# Patient Record
Sex: MALE | Race: WHITE | URBAN - METROPOLITAN AREA
[De-identification: names, ages, dates, MRNs, and addresses within clinical notes are randomized per-mention and may not be internally consistent; named-entity substitution may affect disease eponyms.]

---

## 2022-03-15 ENCOUNTER — OFFICE VISIT (OUTPATIENT)
Dept: PRIMARY CARE CLINIC | Age: 52
End: 2022-03-15
Payer: COMMERCIAL

## 2022-03-15 VITALS
WEIGHT: 315 LBS | DIASTOLIC BLOOD PRESSURE: 68 MMHG | SYSTOLIC BLOOD PRESSURE: 122 MMHG | TEMPERATURE: 97.9 F | HEART RATE: 93 BPM | OXYGEN SATURATION: 96 %

## 2022-03-15 DIAGNOSIS — R09.81 NASAL CONGESTION: ICD-10-CM

## 2022-03-15 DIAGNOSIS — R52 BODY ACHES: ICD-10-CM

## 2022-03-15 DIAGNOSIS — R50.9 FEVER, UNSPECIFIED FEVER CAUSE: Primary | ICD-10-CM

## 2022-03-15 DIAGNOSIS — R11.0 NAUSEA: ICD-10-CM

## 2022-03-15 LAB
INFLUENZA A ANTIGEN, POC: NORMAL
INFLUENZA B ANTIGEN, POC: NORMAL
Lab: NORMAL
QC PASS/FAIL: NORMAL
SARS-COV-2 RDRP RESP QL NAA+PROBE: NEGATIVE

## 2022-03-15 PROCEDURE — 87804 INFLUENZA ASSAY W/OPTIC: CPT | Performed by: NURSE PRACTITIONER

## 2022-03-15 PROCEDURE — 3017F COLORECTAL CA SCREEN DOC REV: CPT | Performed by: NURSE PRACTITIONER

## 2022-03-15 PROCEDURE — G8421 BMI NOT CALCULATED: HCPCS | Performed by: NURSE PRACTITIONER

## 2022-03-15 PROCEDURE — 1036F TOBACCO NON-USER: CPT | Performed by: NURSE PRACTITIONER

## 2022-03-15 PROCEDURE — G8484 FLU IMMUNIZE NO ADMIN: HCPCS | Performed by: NURSE PRACTITIONER

## 2022-03-15 PROCEDURE — 99213 OFFICE O/P EST LOW 20 MIN: CPT | Performed by: NURSE PRACTITIONER

## 2022-03-15 PROCEDURE — 87635 SARS-COV-2 COVID-19 AMP PRB: CPT | Performed by: NURSE PRACTITIONER

## 2022-03-15 PROCEDURE — G8427 DOCREV CUR MEDS BY ELIG CLIN: HCPCS | Performed by: NURSE PRACTITIONER

## 2022-03-15 RX ORDER — OMEPRAZOLE 20 MG/1
CAPSULE, DELAYED RELEASE ORAL
COMMUNITY
Start: 2022-01-22

## 2022-03-15 RX ORDER — LANCETS 33 GAUGE
EACH MISCELLANEOUS
COMMUNITY
Start: 2022-03-14

## 2022-03-15 RX ORDER — SITAGLIPTIN AND METFORMIN HYDROCHLORIDE 1000; 50 MG/1; MG/1
TABLET, FILM COATED ORAL
COMMUNITY
Start: 2022-02-08

## 2022-03-15 RX ORDER — LOSARTAN POTASSIUM 100 MG/1
TABLET ORAL
COMMUNITY
Start: 2022-01-13

## 2022-03-15 RX ORDER — REPAGLINIDE 2 MG/1
TABLET ORAL
COMMUNITY
Start: 2022-02-09

## 2022-03-15 RX ORDER — METOPROLOL SUCCINATE 50 MG/1
TABLET, EXTENDED RELEASE ORAL
COMMUNITY
Start: 2022-03-07

## 2022-03-15 RX ORDER — SOTALOL HYDROCHLORIDE 160 MG/1
TABLET ORAL
COMMUNITY
Start: 2022-01-13

## 2022-03-15 RX ORDER — FLUTICASONE PROPIONATE 50 MCG
2 SPRAY, SUSPENSION (ML) NASAL DAILY
Qty: 16 G | Refills: 0 | Status: SHIPPED | OUTPATIENT
Start: 2022-03-15

## 2022-03-15 RX ORDER — DAPAGLIFLOZIN 10 MG/1
TABLET, FILM COATED ORAL
COMMUNITY
Start: 2022-02-08

## 2022-03-15 RX ORDER — ONDANSETRON 4 MG/1
4 TABLET, ORALLY DISINTEGRATING ORAL 3 TIMES DAILY PRN
Qty: 21 TABLET | Refills: 0 | Status: SHIPPED | OUTPATIENT
Start: 2022-03-15

## 2022-03-15 RX ORDER — DILTIAZEM HYDROCHLORIDE 180 MG/1
CAPSULE, EXTENDED RELEASE ORAL
COMMUNITY
Start: 2021-12-29

## 2022-03-15 RX ORDER — BLOOD SUGAR DIAGNOSTIC
STRIP MISCELLANEOUS
COMMUNITY
Start: 2021-12-29

## 2022-03-15 ASSESSMENT — PATIENT HEALTH QUESTIONNAIRE - PHQ9
SUM OF ALL RESPONSES TO PHQ QUESTIONS 1-9: 0
SUM OF ALL RESPONSES TO PHQ QUESTIONS 1-9: 0
2. FEELING DOWN, DEPRESSED OR HOPELESS: 0
SUM OF ALL RESPONSES TO PHQ9 QUESTIONS 1 & 2: 0
SUM OF ALL RESPONSES TO PHQ QUESTIONS 1-9: 0
1. LITTLE INTEREST OR PLEASURE IN DOING THINGS: 0
SUM OF ALL RESPONSES TO PHQ QUESTIONS 1-9: 0

## 2022-03-15 NOTE — PROGRESS NOTES
Chief Complaint   Concern For COVID-19 (since yesterday, achey, tired, nausea, ha, sinus pressure, fever - 102 yesterday,   diarrhea this am  )      History of Present Illness   Source of history provided by:  patient. Elan Root is a 46 y.o. old male who presents to the walk in clinic with complaints of Fever, Chills, Generalized Body Aches, Headache, Nasal Congestion, Post nasal drip, Nausea, Diarrhea  and Fatigue x 1 days. States symptoms have improved since onset. Has been taking no OTC medications. Denies any Cough, Shortness of breath, Vomiting, Chest Pain, LE Edema, Abdominal Pain, Rash or Close contact with a lab confirmed COVID-19 patient within 14 days of symptom onset . Denies any hx of asthma, COPD, emphysema or pneumonia. ROS   Pertinent positives and negatives are stated within HPI, all other systems reviewed and are negative. Past Medical History:  has no past medical history on file. Past Surgical History:  has no past surgical history on file. Social History:  reports that he has never smoked. He has never used smokeless tobacco.  Family History: family history is not on file. Allergies: Lactose    Physical Exam   Vital Signs:  /68 (Site: Left Upper Arm, Position: Sitting, Cuff Size: Large Adult)   Pulse 93   Temp 97.9 °F (36.6 °C) (Temporal)   Wt (!) 346 lb 3.2 oz (157 kg)   SpO2 96%    Oxygen Saturation Interpretation: Normal.    Constitutional:  Alert, development consistent with age. NAD. Moderate amount of swelling noted to bilateral nares. Head:  NC/NT. Airway patent. Ears: TMs clear bilaterally. Canals without exudate or swelling bilaterally. Mouth: Posterior pharynx with mild erythema and clear postnasal drip. No tonsillar hypertrophy or exudate. Neck:  Normal ROM. Supple. No anterior cervical adenopathy noted. Lungs: CTAB without wheezes, rales, or rhonchi.    CV:  Regular rate and rhythm, normal heart sounds, without pathological murmurs, ectopy, gallops, or rubs. Skin:  Normal turgor. Warm, dry, without visible rash. Lymphatic: No lymphangitis or adenopathy noted. Neurological:  Oriented. Motor functions intact. Lab / Imaging Results   (All laboratory and radiology results have been personally reviewed by myself)  Labs:  Results for orders placed or performed in visit on 03/15/22   POCT COVID-19 Rapid, NAAT   Result Value Ref Range    SARS-COV-2, RdRp gene Negative Negative    Lot Number [id number]     QC Pass/Fail pass    POCT Influenza A/B Antigen (BD Veritor)   Result Value Ref Range    Inflenza A Ag neg     Influenza B Ag neg        Imaging: All Radiology results interpreted by Radiologist unless otherwise noted. No results found. Medical Decision Making   Pt non-toxic, in no apparent distress and stable at time of discharge. Assessment/Plan   aNtan Espinoza was seen today for concern for covid-19. Diagnoses and all orders for this visit:    Fever, unspecified fever cause  -     POCT COVID-19 Rapid, NAAT  -     POCT Influenza A/B Antigen (BD Veritor)    Body aches  -     POCT COVID-19 Rapid, NAAT  -     POCT Influenza A/B Antigen (BD Veritor)    Nausea  -     POCT COVID-19 Rapid, NAAT  -     POCT Influenza A/B Antigen (BD Veritor)  -     ondansetron (ZOFRAN-ODT) 4 MG disintegrating tablet; Take 1 tablet by mouth 3 times daily as needed for Nausea or Vomiting    Nasal congestion  -     fluticasone (FLONASE) 50 MCG/ACT nasal spray; 2 sprays by Each Nostril route daily        Rapid COVID-19 and influenza swabs obtained and noted to be negative. Increase fluids and rest. Symptomatic relief discussed including Tylenol prn pain/fever. Schedule f/u with PCP in 7-10 days if symptoms persist. ED sooner if symptoms worsen or change. ED immediately with high or refractory fever, progressive SOB, dyspnea, CP, calf pain/swelling, shaking chills, vomiting, abdominal pain, lethargy, flank pain, or decreased urinary output.  Pt verbalizes understanding and is in agreement with plan of care. All questions answered. Reinaldo Renner APRN - NP    This visit was provided as a focused evaluation during the [place] -19 pandemic/national emergency. A comprehensive review of all previous patient history and testing was not conducted. Pertinent findings were elicited during the visit. *NOTE: This report was transcribed using voice recognition software. Every effort was made to ensure accuracy; however, inadvertent computerized transcription errors may be present.

## 2023-08-28 SDOH — HEALTH STABILITY: PHYSICAL HEALTH: ON AVERAGE, HOW MANY MINUTES DO YOU ENGAGE IN EXERCISE AT THIS LEVEL?: 20 MIN

## 2023-08-28 SDOH — HEALTH STABILITY: PHYSICAL HEALTH: ON AVERAGE, HOW MANY DAYS PER WEEK DO YOU ENGAGE IN MODERATE TO STRENUOUS EXERCISE (LIKE A BRISK WALK)?: 2 DAYS

## 2023-08-31 ENCOUNTER — OFFICE VISIT (OUTPATIENT)
Dept: PRIMARY CARE CLINIC | Age: 53
End: 2023-08-31
Payer: COMMERCIAL

## 2023-08-31 VITALS
SYSTOLIC BLOOD PRESSURE: 118 MMHG | HEIGHT: 70 IN | HEART RATE: 78 BPM | OXYGEN SATURATION: 97 % | WEIGHT: 315 LBS | TEMPERATURE: 97.6 F | DIASTOLIC BLOOD PRESSURE: 70 MMHG | BODY MASS INDEX: 45.1 KG/M2

## 2023-08-31 DIAGNOSIS — Z76.89 ENCOUNTER TO ESTABLISH CARE WITH NEW DOCTOR: Primary | ICD-10-CM

## 2023-08-31 DIAGNOSIS — G47.33 OSA ON CPAP: ICD-10-CM

## 2023-08-31 DIAGNOSIS — R29.898 LEFT ARM WEAKNESS: ICD-10-CM

## 2023-08-31 DIAGNOSIS — E11.9 TYPE 2 DIABETES MELLITUS WITHOUT COMPLICATION, WITHOUT LONG-TERM CURRENT USE OF INSULIN (HCC): ICD-10-CM

## 2023-08-31 DIAGNOSIS — M54.12 CERVICAL RADICULOPATHY: ICD-10-CM

## 2023-08-31 DIAGNOSIS — Z99.89 OSA ON CPAP: ICD-10-CM

## 2023-08-31 DIAGNOSIS — I48.0 PAROXYSMAL ATRIAL FIBRILLATION (HCC): ICD-10-CM

## 2023-08-31 DIAGNOSIS — I10 ESSENTIAL HYPERTENSION: ICD-10-CM

## 2023-08-31 PROBLEM — I48.91 ATRIAL FIBRILLATION (HCC): Status: ACTIVE | Noted: 2018-10-15

## 2023-08-31 PROBLEM — D12.6 TUBULAR ADENOMA OF COLON: Status: ACTIVE | Noted: 2023-08-31

## 2023-08-31 PROBLEM — G47.30 SLEEP APNEA: Status: ACTIVE | Noted: 2022-02-21

## 2023-08-31 PROBLEM — K21.9 GASTROESOPHAGEAL REFLUX DISEASE: Status: ACTIVE | Noted: 2021-10-12

## 2023-08-31 PROBLEM — M51.9 LUMBAR DISC DISEASE: Status: ACTIVE | Noted: 2023-08-31

## 2023-08-31 PROBLEM — D12.6 TUBULAR ADENOMA OF COLON: Status: RESOLVED | Noted: 2023-08-31 | Resolved: 2023-08-31

## 2023-08-31 LAB — HBA1C MFR BLD: 7.5 %

## 2023-08-31 PROCEDURE — G8427 DOCREV CUR MEDS BY ELIG CLIN: HCPCS | Performed by: STUDENT IN AN ORGANIZED HEALTH CARE EDUCATION/TRAINING PROGRAM

## 2023-08-31 PROCEDURE — 3051F HG A1C>EQUAL 7.0%<8.0%: CPT | Performed by: STUDENT IN AN ORGANIZED HEALTH CARE EDUCATION/TRAINING PROGRAM

## 2023-08-31 PROCEDURE — 1036F TOBACCO NON-USER: CPT | Performed by: STUDENT IN AN ORGANIZED HEALTH CARE EDUCATION/TRAINING PROGRAM

## 2023-08-31 PROCEDURE — 3074F SYST BP LT 130 MM HG: CPT | Performed by: STUDENT IN AN ORGANIZED HEALTH CARE EDUCATION/TRAINING PROGRAM

## 2023-08-31 PROCEDURE — G8417 CALC BMI ABV UP PARAM F/U: HCPCS | Performed by: STUDENT IN AN ORGANIZED HEALTH CARE EDUCATION/TRAINING PROGRAM

## 2023-08-31 PROCEDURE — 2022F DILAT RTA XM EVC RTNOPTHY: CPT | Performed by: STUDENT IN AN ORGANIZED HEALTH CARE EDUCATION/TRAINING PROGRAM

## 2023-08-31 PROCEDURE — 99215 OFFICE O/P EST HI 40 MIN: CPT | Performed by: STUDENT IN AN ORGANIZED HEALTH CARE EDUCATION/TRAINING PROGRAM

## 2023-08-31 PROCEDURE — 3078F DIAST BP <80 MM HG: CPT | Performed by: STUDENT IN AN ORGANIZED HEALTH CARE EDUCATION/TRAINING PROGRAM

## 2023-08-31 PROCEDURE — 3017F COLORECTAL CA SCREEN DOC REV: CPT | Performed by: STUDENT IN AN ORGANIZED HEALTH CARE EDUCATION/TRAINING PROGRAM

## 2023-08-31 PROCEDURE — 83036 HEMOGLOBIN GLYCOSYLATED A1C: CPT | Performed by: STUDENT IN AN ORGANIZED HEALTH CARE EDUCATION/TRAINING PROGRAM

## 2023-08-31 RX ORDER — DULAGLUTIDE 0.75 MG/.5ML
0.75 INJECTION, SOLUTION SUBCUTANEOUS WEEKLY
Qty: 0.5 ML | Refills: 3 | Status: SHIPPED | OUTPATIENT
Start: 2023-08-31

## 2023-08-31 RX ORDER — EMPAGLIFLOZIN 25 MG/1
25 TABLET, FILM COATED ORAL DAILY
COMMUNITY
Start: 2023-06-22

## 2023-08-31 RX ORDER — PROCHLORPERAZINE 25 MG/1
SUPPOSITORY RECTAL
COMMUNITY
Start: 2023-08-26

## 2023-08-31 SDOH — ECONOMIC STABILITY: FOOD INSECURITY: WITHIN THE PAST 12 MONTHS, THE FOOD YOU BOUGHT JUST DIDN'T LAST AND YOU DIDN'T HAVE MONEY TO GET MORE.: NEVER TRUE

## 2023-08-31 SDOH — ECONOMIC STABILITY: FOOD INSECURITY: WITHIN THE PAST 12 MONTHS, YOU WORRIED THAT YOUR FOOD WOULD RUN OUT BEFORE YOU GOT MONEY TO BUY MORE.: NEVER TRUE

## 2023-08-31 SDOH — ECONOMIC STABILITY: INCOME INSECURITY: HOW HARD IS IT FOR YOU TO PAY FOR THE VERY BASICS LIKE FOOD, HOUSING, MEDICAL CARE, AND HEATING?: NOT HARD AT ALL

## 2023-08-31 SDOH — ECONOMIC STABILITY: HOUSING INSECURITY
IN THE LAST 12 MONTHS, WAS THERE A TIME WHEN YOU DID NOT HAVE A STEADY PLACE TO SLEEP OR SLEPT IN A SHELTER (INCLUDING NOW)?: NO

## 2023-08-31 ASSESSMENT — PATIENT HEALTH QUESTIONNAIRE - PHQ9
2. FEELING DOWN, DEPRESSED OR HOPELESS: 0
SUM OF ALL RESPONSES TO PHQ QUESTIONS 1-9: 0
1. LITTLE INTEREST OR PLEASURE IN DOING THINGS: 0
SUM OF ALL RESPONSES TO PHQ QUESTIONS 1-9: 0
SUM OF ALL RESPONSES TO PHQ9 QUESTIONS 1 & 2: 0
SUM OF ALL RESPONSES TO PHQ QUESTIONS 1-9: 0
SUM OF ALL RESPONSES TO PHQ QUESTIONS 1-9: 0

## 2023-08-31 ASSESSMENT — ENCOUNTER SYMPTOMS
CONSTIPATION: 0
NAUSEA: 0
COUGH: 0
SHORTNESS OF BREATH: 0
DIARRHEA: 1

## 2023-08-31 NOTE — ASSESSMENT & PLAN NOTE
Chronic, mildly uncontrolled today with A1c 7.5  Stop rapaglinide  Start Trulicity 7.42 mg weekly  Continue Janumet  mg twice daily, Jardiance 25 mg daily  Diabetic wellness when able

## 2023-08-31 NOTE — ASSESSMENT & PLAN NOTE
Chronic, rate and rhythm controlled  Follows with cardiology  Continue diltiazem  mg daily, metoprolol XL 50 mg daily, sotalol 160 mg twice daily

## 2023-08-31 NOTE — ASSESSMENT & PLAN NOTE
Chronic, controlled  Follows with cardiology  Continue diltiazem  mg daily, losartan 100 mg daily, metoprolol XL 50 mg daily, sotalol 160 mg twice daily

## 2023-08-31 NOTE — ASSESSMENT & PLAN NOTE
Acute, associated with left-sided radiculopathy including one episode of left arm weakness  Check MR cervical spine  Consider PT

## 2023-09-25 ENCOUNTER — OFFICE VISIT (OUTPATIENT)
Dept: PRIMARY CARE CLINIC | Age: 53
End: 2023-09-25
Payer: COMMERCIAL

## 2023-09-25 VITALS
HEART RATE: 89 BPM | HEIGHT: 70 IN | SYSTOLIC BLOOD PRESSURE: 132 MMHG | BODY MASS INDEX: 45.1 KG/M2 | TEMPERATURE: 98 F | WEIGHT: 315 LBS | OXYGEN SATURATION: 96 % | DIASTOLIC BLOOD PRESSURE: 86 MMHG

## 2023-09-25 DIAGNOSIS — M25.511 ACUTE PAIN OF RIGHT SHOULDER: Primary | ICD-10-CM

## 2023-09-25 PROCEDURE — 3079F DIAST BP 80-89 MM HG: CPT

## 2023-09-25 PROCEDURE — G8417 CALC BMI ABV UP PARAM F/U: HCPCS

## 2023-09-25 PROCEDURE — 3075F SYST BP GE 130 - 139MM HG: CPT

## 2023-09-25 PROCEDURE — 1036F TOBACCO NON-USER: CPT

## 2023-09-25 PROCEDURE — 99213 OFFICE O/P EST LOW 20 MIN: CPT

## 2023-09-25 PROCEDURE — G8427 DOCREV CUR MEDS BY ELIG CLIN: HCPCS

## 2023-09-25 PROCEDURE — 3017F COLORECTAL CA SCREEN DOC REV: CPT

## 2023-09-25 RX ORDER — TIZANIDINE 4 MG/1
4 TABLET ORAL NIGHTLY PRN
Qty: 10 TABLET | Refills: 0 | Status: SHIPPED
Start: 2023-09-25 | End: 2023-09-25 | Stop reason: CLARIF

## 2023-09-25 NOTE — PROGRESS NOTES
Chief Complaint:   Shoulder Pain (Right shoulder discomfort, having trouble lifting It )      History of Present Illness   Source of history provided by:  patient. Patti Donato is a 48 y.o. old male presenting to walk-in with right shoulder pain x few days. Denies any known injury. The pain is aggravated by movement and alleviated with rest. Pt states the pain in the shoulder and does not radiate down the arm or into the neck. Denies any weakness, paresthesias, swelling, neck pain or injury, HA, hand weakness, or associated CP or SOB. Has been taking acetaminophen with minimal symptomatic relief. Review of Systems   Unless otherwise stated in this report or unable to obtain because of the patient's clinical or mental status as evidenced by the medical record, this patients's positive and negative responses for Review of Systems, constitutional, psych, eyes, ENT, cardiovascular, respiratory, gastrointestinal, neurological, genitourinary, musculoskeletal, integument systems and systems related to the presenting problem are either stated in the preceding or were not pertinent or were negative for the symptoms and/or complaints related to the medical problem. Past Medical History:  has a past medical history of Atrial fibrillation ([address]), DEVIKA (generalized anxiety disorder), OCD (obsessive compulsive disorder), Tubular adenoma of colon, and Type 2 diabetes mellitus without complication ([address]). Past Surgical History:  has a past surgical history that includes Tonsillectomy and Biceps tendon repair (Right). Social History:  reports that he has never smoked. He has quit using smokeless tobacco. He reports that he does not currently use alcohol. He reports that he does not use drugs. Family History: family history includes Alcohol Abuse in his father;  Anxiety Disorder in his brother; Arthritis in his paternal grandmother; Atrial Fibrillation in his paternal grandmother; Breast Cancer (age of onset:

## 2023-10-02 DIAGNOSIS — M54.12 CERVICAL RADICULOPATHY: ICD-10-CM

## 2023-10-02 DIAGNOSIS — R29.898 LEFT ARM WEAKNESS: ICD-10-CM

## 2023-11-05 DIAGNOSIS — E11.9 TYPE 2 DIABETES MELLITUS WITHOUT COMPLICATION, WITHOUT LONG-TERM CURRENT USE OF INSULIN (HCC): Primary | ICD-10-CM

## 2023-11-05 RX ORDER — REPAGLINIDE 2 MG/1
2 TABLET ORAL
Qty: 90 TABLET | Refills: 2 | Status: SHIPPED | OUTPATIENT
Start: 2023-11-05

## 2023-11-05 RX ORDER — SITAGLIPTIN AND METFORMIN HYDROCHLORIDE 1000; 50 MG/1; MG/1
1 TABLET, FILM COATED ORAL 2 TIMES DAILY WITH MEALS
Qty: 60 TABLET | Refills: 2 | Status: SHIPPED | OUTPATIENT
Start: 2023-11-05

## 2023-12-19 ENCOUNTER — OFFICE VISIT (OUTPATIENT)
Dept: PRIMARY CARE CLINIC | Age: 53
End: 2023-12-19
Payer: COMMERCIAL

## 2023-12-19 VITALS
SYSTOLIC BLOOD PRESSURE: 130 MMHG | TEMPERATURE: 97.8 F | OXYGEN SATURATION: 97 % | HEART RATE: 91 BPM | DIASTOLIC BLOOD PRESSURE: 72 MMHG | WEIGHT: 315 LBS | BODY MASS INDEX: 50.05 KG/M2

## 2023-12-19 DIAGNOSIS — I48.0 PAROXYSMAL ATRIAL FIBRILLATION (HCC): ICD-10-CM

## 2023-12-19 DIAGNOSIS — Z12.5 SCREENING FOR PROSTATE CANCER: ICD-10-CM

## 2023-12-19 DIAGNOSIS — E11.9 TYPE 2 DIABETES MELLITUS WITHOUT COMPLICATION, WITHOUT LONG-TERM CURRENT USE OF INSULIN (HCC): Primary | ICD-10-CM

## 2023-12-19 DIAGNOSIS — I10 ESSENTIAL HYPERTENSION: ICD-10-CM

## 2023-12-19 DIAGNOSIS — Z79.899 LONG-TERM CURRENT USE OF PROTON PUMP INHIBITOR THERAPY: ICD-10-CM

## 2023-12-19 DIAGNOSIS — M54.12 CERVICAL RADICULOPATHY: ICD-10-CM

## 2023-12-19 LAB — HBA1C MFR BLD: 8.3 %

## 2023-12-19 PROCEDURE — G8417 CALC BMI ABV UP PARAM F/U: HCPCS | Performed by: STUDENT IN AN ORGANIZED HEALTH CARE EDUCATION/TRAINING PROGRAM

## 2023-12-19 PROCEDURE — 3017F COLORECTAL CA SCREEN DOC REV: CPT | Performed by: STUDENT IN AN ORGANIZED HEALTH CARE EDUCATION/TRAINING PROGRAM

## 2023-12-19 PROCEDURE — 2022F DILAT RTA XM EVC RTNOPTHY: CPT | Performed by: STUDENT IN AN ORGANIZED HEALTH CARE EDUCATION/TRAINING PROGRAM

## 2023-12-19 PROCEDURE — 99214 OFFICE O/P EST MOD 30 MIN: CPT | Performed by: STUDENT IN AN ORGANIZED HEALTH CARE EDUCATION/TRAINING PROGRAM

## 2023-12-19 PROCEDURE — 3078F DIAST BP <80 MM HG: CPT | Performed by: STUDENT IN AN ORGANIZED HEALTH CARE EDUCATION/TRAINING PROGRAM

## 2023-12-19 PROCEDURE — 1036F TOBACCO NON-USER: CPT | Performed by: STUDENT IN AN ORGANIZED HEALTH CARE EDUCATION/TRAINING PROGRAM

## 2023-12-19 PROCEDURE — 3075F SYST BP GE 130 - 139MM HG: CPT | Performed by: STUDENT IN AN ORGANIZED HEALTH CARE EDUCATION/TRAINING PROGRAM

## 2023-12-19 PROCEDURE — G8484 FLU IMMUNIZE NO ADMIN: HCPCS | Performed by: STUDENT IN AN ORGANIZED HEALTH CARE EDUCATION/TRAINING PROGRAM

## 2023-12-19 PROCEDURE — 83036 HEMOGLOBIN GLYCOSYLATED A1C: CPT | Performed by: STUDENT IN AN ORGANIZED HEALTH CARE EDUCATION/TRAINING PROGRAM

## 2023-12-19 PROCEDURE — 3052F HG A1C>EQUAL 8.0%<EQUAL 9.0%: CPT | Performed by: STUDENT IN AN ORGANIZED HEALTH CARE EDUCATION/TRAINING PROGRAM

## 2023-12-19 PROCEDURE — G8427 DOCREV CUR MEDS BY ELIG CLIN: HCPCS | Performed by: STUDENT IN AN ORGANIZED HEALTH CARE EDUCATION/TRAINING PROGRAM

## 2023-12-19 RX ORDER — LANCETS 30 GAUGE
EACH MISCELLANEOUS
Qty: 300 EACH | Refills: 3 | Status: SHIPPED | OUTPATIENT
Start: 2023-12-19

## 2023-12-19 RX ORDER — GLUCOSAMINE HCL/CHONDROITIN SU 500-400 MG
CAPSULE ORAL
Qty: 300 STRIP | Refills: 3 | Status: SHIPPED | OUTPATIENT
Start: 2023-12-19

## 2023-12-19 RX ORDER — BLOOD-GLUCOSE METER
KIT MISCELLANEOUS
Qty: 1 KIT | Refills: 0 | Status: SHIPPED | OUTPATIENT
Start: 2023-12-19

## 2023-12-19 NOTE — PROGRESS NOTES
ESTABLISHED PRIMARY CARE VISIT    23  Name: Yovana Grover   : 1970   Age: 48 y.o. Sex: male        Assessment & Plan:     Problem List Items Addressed This Visit          Circulatory    Essential hypertension     Chronic, controlled  Follows with cardiology  Continue diltiazem  mg daily, losartan 100 mg daily, metoprolol XL 50 mg daily, sotalol 160 mg twice daily         Relevant Orders    Comprehensive Metabolic Panel, Fasting    Microalbumin, Ur    Atrial fibrillation (HCC)     Chronic, rate and rhythm controlled  Follows with cardiology  Continue diltiazem  mg daily, metoprolol XL 50 mg daily, sotalol 160 mg twice daily         Relevant Orders    Comprehensive Metabolic Panel, Fasting    CBC    TSH       Endocrine    Type 2 diabetes mellitus (HCC) - Primary     Chronic, uncontrolled with A1c 8.3  Side effects with GLP-1 agonist  Restart diabetic diet  Continue Janumet  mg twice daily, Jardiance 25 mg daily, repaglinide 2 mg 3 times daily  Recheck A1c next visit  Diabetic wellness when able         Relevant Medications    glucose monitoring kit    blood glucose monitor strips    Lancets MISC    Other Relevant Orders    POCT glycosylated hemoglobin (Hb A1C) (Completed)    Hemoglobin A1C    Lipid, Fasting    Comprehensive Metabolic Panel, Fasting    Microalbumin, Ur    TSH       Nervous and Auditory    Cervical radiculopathy     Improved with PT          Other Visit Diagnoses       Long-term current use of proton pump inhibitor therapy        Relevant Orders    Vitamin B12 & Folate    Magnesium    Screening for prostate cancer        Relevant Orders    PSA Screening          Counseled patient regarding above diagnosis, including possible risks and complications, especially if left uncontrolled.   Counseled patient as appropriate and relevant regarding any possible side effects, risks, and alternatives to treatment; the patient verbalizes understanding, and is in

## 2023-12-28 NOTE — ASSESSMENT & PLAN NOTE
Chronic, uncontrolled with A1c 8.3  Side effects with GLP-1 agonist  Restart diabetic diet  Continue Janumet  mg twice daily, Jardiance 25 mg daily, repaglinide 2 mg 3 times daily  Recheck A1c next visit  Diabetic wellness when able

## 2024-01-02 ENCOUNTER — PATIENT MESSAGE (OUTPATIENT)
Dept: PRIMARY CARE CLINIC | Age: 54
End: 2024-01-02

## 2024-01-02 RX ORDER — OMEPRAZOLE 20 MG/1
20 CAPSULE, DELAYED RELEASE ORAL DAILY
Qty: 90 CAPSULE | Refills: 1 | Status: SHIPPED | OUTPATIENT
Start: 2024-01-02 | End: 2024-06-30

## 2024-01-02 NOTE — TELEPHONE ENCOUNTER
From: Luciano Stone  To: Dr. Shayne Chavez  Sent: 1/2/2024 11:24 AM EST  Subject: Omeprazole refill    Hi there. Was wondering if you could send a 90 day refill in for my Omeprazole. I got a message stating it was not going to be refilled. My insurance changed to [employer].    Thanks.

## 2024-01-02 NOTE — TELEPHONE ENCOUNTER
Spoke with patient to find out how he takes and he did state can go to [facility] in [place], still   Last ov 12/19  Next ov  04/02

## 2024-03-11 ENCOUNTER — PATIENT MESSAGE (OUTPATIENT)
Dept: PRIMARY CARE CLINIC | Age: 54
End: 2024-03-11

## 2024-03-11 NOTE — TELEPHONE ENCOUNTER
From: Luciano Stone  To: Dr. Shayne Chavez  Sent: 3/11/2024 3:42 PM EDT  Subject: Refill    HI. I was wondering if I can get my prescription for Jardiance refilled.    Thanks

## 2024-03-29 ENCOUNTER — NURSE ONLY (OUTPATIENT)
Dept: PRIMARY CARE CLINIC | Age: 54
End: 2024-03-29
Payer: COMMERCIAL

## 2024-03-29 DIAGNOSIS — I48.0 PAROXYSMAL ATRIAL FIBRILLATION (HCC): ICD-10-CM

## 2024-03-29 DIAGNOSIS — K21.9 GASTROESOPHAGEAL REFLUX DISEASE WITHOUT ESOPHAGITIS: ICD-10-CM

## 2024-03-29 DIAGNOSIS — Z79.899 LONG-TERM CURRENT USE OF PROTON PUMP INHIBITOR THERAPY: ICD-10-CM

## 2024-03-29 DIAGNOSIS — I48.91 ATRIAL FIBRILLATION, UNSPECIFIED TYPE (HCC): ICD-10-CM

## 2024-03-29 DIAGNOSIS — E11.9 TYPE 2 DIABETES MELLITUS WITHOUT COMPLICATION, WITHOUT LONG-TERM CURRENT USE OF INSULIN (HCC): Primary | ICD-10-CM

## 2024-03-29 DIAGNOSIS — I10 ESSENTIAL HYPERTENSION: ICD-10-CM

## 2024-03-29 DIAGNOSIS — Z12.5 SCREENING FOR PROSTATE CANCER: ICD-10-CM

## 2024-03-29 LAB
CREATININE URINE: 70.9 MG/DL (ref 40–278)
HCT VFR BLD CALC: 51.8 % (ref 37–54)
HEMOGLOBIN: 16.3 G/DL (ref 12.5–16.5)
MCH RBC QN AUTO: 30.1 PG (ref 26–35)
MCHC RBC AUTO-ENTMCNC: 31.5 G/DL (ref 32–34.5)
MCV RBC AUTO: 95.7 FL (ref 80–99.9)
MICROALBUMIN/CREAT 24H UR: <12 MG/L (ref 0–19)
MICROALBUMIN/CREAT UR-RTO: NORMAL MCG/MG CREAT (ref 0–30)
PDW BLD-RTO: 14.6 % (ref 11.5–15)
PLATELET # BLD: 184 K/UL (ref 130–450)
PMV BLD AUTO: 9.9 FL (ref 7–12)
RBC # BLD: 5.41 M/UL (ref 3.8–5.8)
WBC # BLD: 7.3 K/UL (ref 4.5–11.5)

## 2024-03-29 PROCEDURE — 36415 COLL VENOUS BLD VENIPUNCTURE: CPT | Performed by: STUDENT IN AN ORGANIZED HEALTH CARE EDUCATION/TRAINING PROGRAM

## 2024-03-30 LAB
ALBUMIN SERPL-MCNC: 4.8 G/DL (ref 3.5–5.2)
ALP BLD-CCNC: 55 U/L (ref 40–129)
ALT SERPL-CCNC: 26 U/L (ref 0–40)
ANION GAP SERPL CALCULATED.3IONS-SCNC: 20 MMOL/L (ref 7–16)
AST SERPL-CCNC: 21 U/L (ref 0–39)
BILIRUB SERPL-MCNC: 0.7 MG/DL (ref 0–1.2)
BUN BLDV-MCNC: 20 MG/DL (ref 6–20)
CALCIUM SERPL-MCNC: 10 MG/DL (ref 8.6–10.2)
CHLORIDE BLD-SCNC: 101 MMOL/L (ref 98–107)
CHOLESTEROL, FASTING: 139 MG/DL
CO2: 23 MMOL/L (ref 22–29)
CREAT SERPL-MCNC: 0.8 MG/DL (ref 0.7–1.2)
GFR SERPL CREATININE-BSD FRML MDRD: >90 ML/MIN/1.73M2
GLUCOSE FASTING: 178 MG/DL (ref 74–99)
HBA1C MFR BLD: 7.7 % (ref 4–5.6)
HDLC SERPL-MCNC: 40 MG/DL
LDL CHOLESTEROL: 83 MG/DL
MAGNESIUM: 2.1 MG/DL (ref 1.6–2.6)
POTASSIUM SERPL-SCNC: 5.5 MMOL/L (ref 3.5–5)
PROSTATE SPECIFIC ANTIGEN: 0.49 NG/ML (ref 0–4)
SODIUM BLD-SCNC: 144 MMOL/L (ref 132–146)
TOTAL PROTEIN: 7.9 G/DL (ref 6.4–8.3)
TRIGLYCERIDE, FASTING: 80 MG/DL
TSH SERPL DL<=0.05 MIU/L-ACNC: 2.27 UIU/ML (ref 0.27–4.2)
VLDLC SERPL CALC-MCNC: 16 MG/DL

## 2024-03-30 ASSESSMENT — PATIENT HEALTH QUESTIONNAIRE - PHQ9
2. FEELING DOWN, DEPRESSED OR HOPELESS: NOT AT ALL
2. FEELING DOWN, DEPRESSED OR HOPELESS: NOT AT ALL
SUM OF ALL RESPONSES TO PHQ QUESTIONS 1-9: 0
SUM OF ALL RESPONSES TO PHQ9 QUESTIONS 1 & 2: 0
1. LITTLE INTEREST OR PLEASURE IN DOING THINGS: NOT AT ALL
SUM OF ALL RESPONSES TO PHQ QUESTIONS 1-9: 0
1. LITTLE INTEREST OR PLEASURE IN DOING THINGS: NOT AT ALL
SUM OF ALL RESPONSES TO PHQ QUESTIONS 1-9: 0
SUM OF ALL RESPONSES TO PHQ QUESTIONS 1-9: 0
SUM OF ALL RESPONSES TO PHQ9 QUESTIONS 1 & 2: 0

## 2024-03-31 LAB
FOLATE: 16 NG/ML (ref 4.8–24.2)
VITAMIN B-12: 249 PG/ML (ref 211–946)

## 2024-04-02 ENCOUNTER — OFFICE VISIT (OUTPATIENT)
Dept: PRIMARY CARE CLINIC | Age: 54
End: 2024-04-02
Payer: COMMERCIAL

## 2024-04-02 VITALS
DIASTOLIC BLOOD PRESSURE: 70 MMHG | OXYGEN SATURATION: 96 % | HEART RATE: 81 BPM | WEIGHT: 315 LBS | HEIGHT: 70 IN | BODY MASS INDEX: 45.1 KG/M2 | SYSTOLIC BLOOD PRESSURE: 130 MMHG | TEMPERATURE: 97.5 F

## 2024-04-02 DIAGNOSIS — E11.9 TYPE 2 DIABETES MELLITUS WITHOUT COMPLICATION, WITHOUT LONG-TERM CURRENT USE OF INSULIN (HCC): Primary | ICD-10-CM

## 2024-04-02 DIAGNOSIS — I48.0 PAROXYSMAL ATRIAL FIBRILLATION (HCC): ICD-10-CM

## 2024-04-02 DIAGNOSIS — E53.8 B12 DEFICIENCY: ICD-10-CM

## 2024-04-02 DIAGNOSIS — K21.9 GASTROESOPHAGEAL REFLUX DISEASE WITHOUT ESOPHAGITIS: ICD-10-CM

## 2024-04-02 DIAGNOSIS — E87.5 HYPERKALEMIA: ICD-10-CM

## 2024-04-02 LAB
ANION GAP SERPL CALCULATED.3IONS-SCNC: 13 MMOL/L (ref 7–16)
BUN BLDV-MCNC: 17 MG/DL (ref 6–20)
CALCIUM SERPL-MCNC: 9.5 MG/DL (ref 8.6–10.2)
CHLORIDE BLD-SCNC: 103 MMOL/L (ref 98–107)
CO2: 25 MMOL/L (ref 22–29)
CREAT SERPL-MCNC: 0.7 MG/DL (ref 0.7–1.2)
GFR SERPL CREATININE-BSD FRML MDRD: >90 ML/MIN/1.73M2
GLUCOSE BLD-MCNC: 231 MG/DL (ref 74–99)
POTASSIUM SERPL-SCNC: 4.9 MMOL/L (ref 3.5–5)
SODIUM BLD-SCNC: 141 MMOL/L (ref 132–146)

## 2024-04-02 PROCEDURE — G8417 CALC BMI ABV UP PARAM F/U: HCPCS | Performed by: STUDENT IN AN ORGANIZED HEALTH CARE EDUCATION/TRAINING PROGRAM

## 2024-04-02 PROCEDURE — G8427 DOCREV CUR MEDS BY ELIG CLIN: HCPCS | Performed by: STUDENT IN AN ORGANIZED HEALTH CARE EDUCATION/TRAINING PROGRAM

## 2024-04-02 PROCEDURE — 3075F SYST BP GE 130 - 139MM HG: CPT | Performed by: STUDENT IN AN ORGANIZED HEALTH CARE EDUCATION/TRAINING PROGRAM

## 2024-04-02 PROCEDURE — 36415 COLL VENOUS BLD VENIPUNCTURE: CPT | Performed by: STUDENT IN AN ORGANIZED HEALTH CARE EDUCATION/TRAINING PROGRAM

## 2024-04-02 PROCEDURE — 99214 OFFICE O/P EST MOD 30 MIN: CPT | Performed by: STUDENT IN AN ORGANIZED HEALTH CARE EDUCATION/TRAINING PROGRAM

## 2024-04-02 PROCEDURE — 2022F DILAT RTA XM EVC RTNOPTHY: CPT | Performed by: STUDENT IN AN ORGANIZED HEALTH CARE EDUCATION/TRAINING PROGRAM

## 2024-04-02 PROCEDURE — 3051F HG A1C>EQUAL 7.0%<8.0%: CPT | Performed by: STUDENT IN AN ORGANIZED HEALTH CARE EDUCATION/TRAINING PROGRAM

## 2024-04-02 PROCEDURE — 3017F COLORECTAL CA SCREEN DOC REV: CPT | Performed by: STUDENT IN AN ORGANIZED HEALTH CARE EDUCATION/TRAINING PROGRAM

## 2024-04-02 PROCEDURE — 3078F DIAST BP <80 MM HG: CPT | Performed by: STUDENT IN AN ORGANIZED HEALTH CARE EDUCATION/TRAINING PROGRAM

## 2024-04-02 PROCEDURE — 1036F TOBACCO NON-USER: CPT | Performed by: STUDENT IN AN ORGANIZED HEALTH CARE EDUCATION/TRAINING PROGRAM

## 2024-04-02 RX ORDER — APIXABAN 2.5 MG/1
TABLET, FILM COATED ORAL
COMMUNITY
Start: 2024-03-20

## 2024-04-02 RX ORDER — SITAGLIPTIN AND METFORMIN HYDROCHLORIDE 1000; 50 MG/1; MG/1
1 TABLET, FILM COATED ORAL 2 TIMES DAILY WITH MEALS
Qty: 180 TABLET | Refills: 1 | Status: SHIPPED | OUTPATIENT
Start: 2024-04-02

## 2024-04-02 RX ORDER — OMEPRAZOLE 20 MG/1
20 CAPSULE, DELAYED RELEASE ORAL DAILY
Qty: 90 CAPSULE | Refills: 1 | Status: SHIPPED | OUTPATIENT
Start: 2024-04-02 | End: 2024-09-29

## 2024-04-02 RX ORDER — REPAGLINIDE 2 MG/1
3 TABLET ORAL
COMMUNITY

## 2024-04-02 NOTE — PROGRESS NOTES
ESTABLISHED PRIMARY CARE VISIT    24  Name: Luciano Stone   : 1970   Age: 54 y.o.  Sex: male        Assessment & Plan:     Problem List Items Addressed This Visit       Type 2 diabetes mellitus (HCC) - Primary     Chronic, uncontrolled with A1c 7.7  Side effects with GLP-1 agonist  Continue working on diabetic diet  Increase repaglinide 3 mg 3 times daily  Continue Janumet  mg twice daily, Jardiance 25 mg daily  Recheck A1c next visit  Diabetic wellness when able         Relevant Medications    JANUMET  MG per tablet    empagliflozin (JARDIANCE) 25 MG tablet    Other Relevant Orders    Hemoglobin A1C    Gastroesophageal reflux disease     Continue omeprazole 20 mg daily         Relevant Medications    omeprazole (PRILOSEC) 20 MG delayed release capsule    Atrial fibrillation (HCC)     Chronic, rate and rhythm controlled  Follows with cardiology, planning echo and holter  Continue diltiazem  mg daily, metoprolol XL 50 mg daily, sotalol 160 mg twice daily         Relevant Medications    ELIQUIS 2.5 MG TABS tablet    apixaban (ELIQUIS) 5 MG TABS tablet    B12 deficiency     Continue supplement         Relevant Medications    Cyanocobalamin (VITAMIN B-12) 500 MCG LOZG    Other Relevant Orders    Vitamin B12 & Folate     Other Visit Diagnoses       Hyperkalemia        Suspect lab/tube issue vs salt substitute  Recheck    Relevant Orders    Basic Metabolic Panel (Completed)          Counseled patient regarding above diagnosis, including possible risks and complications, especially if left uncontrolled.  Counseled patient as appropriate and relevant regarding any possible side effects, risks, and alternatives to treatment; the patient verbalizes understanding, and is in agreement with the plan as detailed above.   All educational materials and instructions were discussed and included on the After Visit Summary.  All questions answered to the patient's satisfaction.  The patient

## 2024-04-09 ASSESSMENT — ENCOUNTER SYMPTOMS
DIARRHEA: 0
NAUSEA: 0
COUGH: 0
SHORTNESS OF BREATH: 0
CONSTIPATION: 0
BACK PAIN: 0

## 2024-04-09 NOTE — ASSESSMENT & PLAN NOTE
Chronic, uncontrolled with A1c 7.7  Side effects with GLP-1 agonist  Continue working on diabetic diet  Increase repaglinide 3 mg 3 times daily  Continue Janumet  mg twice daily, Jardiance 25 mg daily  Recheck A1c next visit  Diabetic wellness when able

## 2024-04-09 NOTE — ASSESSMENT & PLAN NOTE
Chronic, rate and rhythm controlled  Follows with cardiology, planning echo and holter  Continue diltiazem  mg daily, metoprolol XL 50 mg daily, sotalol 160 mg twice daily

## 2024-05-20 LAB — DIABETIC RETINOPATHY: NEGATIVE

## 2024-05-24 DIAGNOSIS — E11.9 TYPE 2 DIABETES MELLITUS WITHOUT COMPLICATIONS (HCC): ICD-10-CM

## 2024-05-28 NOTE — TELEPHONE ENCOUNTER
Patients last appointment 4/2/2024.  Patients next scheduled appointment   Future Appointments   Date Time Provider Department Center   7/11/2024  4:15 PM Shayne Chavez MD [facility]

## 2024-05-29 RX ORDER — REPAGLINIDE 2 MG/1
TABLET ORAL
Qty: 270 TABLET | Refills: 0 | Status: SHIPPED | OUTPATIENT
Start: 2024-05-29

## 2024-06-27 ENCOUNTER — TELEPHONE (OUTPATIENT)
Dept: PRIMARY CARE CLINIC | Age: 54
End: 2024-06-27

## 2024-06-27 NOTE — TELEPHONE ENCOUNTER
----- Message from Deb Shortdong sent at 6/27/2024 10:02 AM EDT -----  Regarding: [facility] Appointment Request  [facility] Appointment Request    Patient needs appointment for [facility] Appointment Type: Existing Condition Follow Up.  Patient requested date: Any available appointment ealier than July 11  Patient requested time: Any available time  Name of provider: Shayne Chavez MD    Additional info: Patient has appointment on July 11 at 4:15 pm and want to reschedule it/ his appointment is about Return in about 3 months (around 7/2/2024).    Reason for Appointment Request: No appointments available during search  --------------------------------------------------------------------------------------------------------------------------    Relationship to Patient: Self     Call Back Information: OK to leave message on voicemail  Preferred Call Back Number: Phone [phone]

## 2024-06-27 NOTE — TELEPHONE ENCOUNTER
Spoke with patient, confirmed he has therapy on 7/11 around same time as PCP visit, requesting to see if he could see doctor a different day/time

## 2024-06-28 NOTE — TELEPHONE ENCOUNTER
No he said yesterday he works from home and would be happy to come in whenever if you were going to squeeze him back in somewhere soon as he would be grateful for you doing so.

## 2024-07-02 ENCOUNTER — PATIENT MESSAGE (OUTPATIENT)
Dept: PRIMARY CARE CLINIC | Age: 54
End: 2024-07-02

## 2024-07-02 DIAGNOSIS — M54.12 CERVICAL RADICULOPATHY: Primary | ICD-10-CM

## 2024-07-02 DIAGNOSIS — M48.02 FORAMINAL STENOSIS OF CERVICAL REGION: ICD-10-CM

## 2024-07-09 ENCOUNTER — OFFICE VISIT (OUTPATIENT)
Dept: PRIMARY CARE CLINIC | Age: 54
End: 2024-07-09
Payer: COMMERCIAL

## 2024-07-09 VITALS
HEIGHT: 70 IN | OXYGEN SATURATION: 96 % | SYSTOLIC BLOOD PRESSURE: 126 MMHG | TEMPERATURE: 97.6 F | DIASTOLIC BLOOD PRESSURE: 78 MMHG | WEIGHT: 315 LBS | BODY MASS INDEX: 45.1 KG/M2 | HEART RATE: 79 BPM

## 2024-07-09 DIAGNOSIS — E11.9 TYPE 2 DIABETES MELLITUS WITHOUT COMPLICATION, WITHOUT LONG-TERM CURRENT USE OF INSULIN (HCC): Primary | ICD-10-CM

## 2024-07-09 DIAGNOSIS — K21.9 GASTROESOPHAGEAL REFLUX DISEASE WITHOUT ESOPHAGITIS: ICD-10-CM

## 2024-07-09 DIAGNOSIS — M54.12 CERVICAL RADICULOPATHY: ICD-10-CM

## 2024-07-09 DIAGNOSIS — L91.8 SKIN TAG: ICD-10-CM

## 2024-07-09 DIAGNOSIS — L60.0 INGROWN NAIL OF GREAT TOE OF RIGHT FOOT: ICD-10-CM

## 2024-07-09 DIAGNOSIS — E53.8 B12 DEFICIENCY: ICD-10-CM

## 2024-07-09 DIAGNOSIS — L03.031 PARONYCHIA OF GREAT TOE OF RIGHT FOOT: ICD-10-CM

## 2024-07-09 PROBLEM — I51.7 CARDIOMEGALY: Status: ACTIVE | Noted: 2024-07-09

## 2024-07-09 PROBLEM — I51.7 LEFT ATRIAL ENLARGEMENT: Status: ACTIVE | Noted: 2024-07-09

## 2024-07-09 PROBLEM — I51.7 LEFT VENTRICULAR HYPERTROPHY: Status: ACTIVE | Noted: 2024-07-09

## 2024-07-09 LAB
FOLATE: >20 NG/ML (ref 4.8–24.2)
HBA1C MFR BLD: 8 %
VITAMIN B-12: 483 PG/ML (ref 211–946)

## 2024-07-09 PROCEDURE — 3078F DIAST BP <80 MM HG: CPT | Performed by: STUDENT IN AN ORGANIZED HEALTH CARE EDUCATION/TRAINING PROGRAM

## 2024-07-09 PROCEDURE — 3017F COLORECTAL CA SCREEN DOC REV: CPT | Performed by: STUDENT IN AN ORGANIZED HEALTH CARE EDUCATION/TRAINING PROGRAM

## 2024-07-09 PROCEDURE — G8427 DOCREV CUR MEDS BY ELIG CLIN: HCPCS | Performed by: STUDENT IN AN ORGANIZED HEALTH CARE EDUCATION/TRAINING PROGRAM

## 2024-07-09 PROCEDURE — G8417 CALC BMI ABV UP PARAM F/U: HCPCS | Performed by: STUDENT IN AN ORGANIZED HEALTH CARE EDUCATION/TRAINING PROGRAM

## 2024-07-09 PROCEDURE — 36415 COLL VENOUS BLD VENIPUNCTURE: CPT | Performed by: STUDENT IN AN ORGANIZED HEALTH CARE EDUCATION/TRAINING PROGRAM

## 2024-07-09 PROCEDURE — 11200 RMVL SKIN TAGS UP TO&INC 15: CPT | Performed by: STUDENT IN AN ORGANIZED HEALTH CARE EDUCATION/TRAINING PROGRAM

## 2024-07-09 PROCEDURE — 3052F HG A1C>EQUAL 8.0%<EQUAL 9.0%: CPT | Performed by: STUDENT IN AN ORGANIZED HEALTH CARE EDUCATION/TRAINING PROGRAM

## 2024-07-09 PROCEDURE — 2022F DILAT RTA XM EVC RTNOPTHY: CPT | Performed by: STUDENT IN AN ORGANIZED HEALTH CARE EDUCATION/TRAINING PROGRAM

## 2024-07-09 PROCEDURE — 1036F TOBACCO NON-USER: CPT | Performed by: STUDENT IN AN ORGANIZED HEALTH CARE EDUCATION/TRAINING PROGRAM

## 2024-07-09 PROCEDURE — 3074F SYST BP LT 130 MM HG: CPT | Performed by: STUDENT IN AN ORGANIZED HEALTH CARE EDUCATION/TRAINING PROGRAM

## 2024-07-09 PROCEDURE — 99214 OFFICE O/P EST MOD 30 MIN: CPT | Performed by: STUDENT IN AN ORGANIZED HEALTH CARE EDUCATION/TRAINING PROGRAM

## 2024-07-09 PROCEDURE — 83036 HEMOGLOBIN GLYCOSYLATED A1C: CPT | Performed by: STUDENT IN AN ORGANIZED HEALTH CARE EDUCATION/TRAINING PROGRAM

## 2024-07-09 RX ORDER — REPAGLINIDE 2 MG/1
2 TABLET ORAL
Qty: 270 TABLET | Refills: 1 | Status: SHIPPED | OUTPATIENT
Start: 2024-07-09

## 2024-07-09 RX ORDER — OMEPRAZOLE 20 MG/1
20 CAPSULE, DELAYED RELEASE ORAL DAILY
Qty: 90 CAPSULE | Refills: 1 | Status: SHIPPED | OUTPATIENT
Start: 2024-07-09 | End: 2025-01-05

## 2024-07-09 RX ORDER — CELECOXIB 100 MG/1
CAPSULE ORAL
COMMUNITY
Start: 2024-06-27 | End: 2024-07-09

## 2024-07-09 RX ORDER — CEPHALEXIN 500 MG/1
500 CAPSULE ORAL 4 TIMES DAILY
Qty: 28 CAPSULE | Refills: 0 | Status: SHIPPED | OUTPATIENT
Start: 2024-07-09 | End: 2024-07-16

## 2024-07-09 RX ORDER — SEMAGLUTIDE 0.68 MG/ML
0.5 INJECTION, SOLUTION SUBCUTANEOUS WEEKLY
Qty: 3 ML | Refills: 3 | Status: SHIPPED | OUTPATIENT
Start: 2024-07-09

## 2024-07-09 RX ORDER — SITAGLIPTIN AND METFORMIN HYDROCHLORIDE 1000; 50 MG/1; MG/1
TABLET, FILM COATED ORAL
COMMUNITY
Start: 2019-04-11 | End: 2024-07-09

## 2024-07-09 RX ORDER — METHYLPREDNISOLONE 4 MG/1
TABLET ORAL
COMMUNITY
Start: 2024-06-27

## 2024-07-09 RX ADMIN — Medication 2 ML: at 14:35

## 2024-07-09 ASSESSMENT — ENCOUNTER SYMPTOMS
DIARRHEA: 0
NAUSEA: 0
SHORTNESS OF BREATH: 0
COUGH: 0
CONSTIPATION: 0

## 2024-07-09 NOTE — ASSESSMENT & PLAN NOTE
Chronic, uncontrolled with A1c 8  Continue working on diabetic diet  Continue increased dose Ozempic 0.5 mg weekly  Continue Jardiance 25 mg daily, metformin 1000 mg twice daily, repaglinide 2 mg 3 times daily  Recheck A1c next visit  Foot exam scheduled with podiatry  Eye exam negative with optometry

## 2024-07-09 NOTE — PROGRESS NOTES
ESTABLISHED PRIMARY CARE VISIT    24  Name: Luciano Stone   : 1970   Age: 54 y.o.  Sex: male        Assessment & Plan:     Problem List Items Addressed This Visit       Type 2 diabetes mellitus (HCC) - Primary     Chronic, uncontrolled with A1c 8  Continue working on diabetic diet  Continue increased dose Ozempic 0.5 mg weekly  Continue Jardiance 25 mg daily, metformin 1000 mg twice daily, repaglinide 2 mg 3 times daily  Recheck A1c next visit  Foot exam with podiatry         Relevant Medications    Semaglutide,0.25 or 0.5MG/DOS, (OZEMPIC, 0.25 OR 0.5 MG/DOSE,) 2 MG/3ML SOPN    repaglinide (PRANDIN) 2 MG tablet    metFORMIN (GLUCOPHAGE) 1000 MG tablet    empagliflozin (JARDIANCE) 25 MG tablet    Other Relevant Orders    POCT glycosylated hemoglobin (Hb A1C) (Completed)    Vitamin B12 & Folate    Gastroesophageal reflux disease     Continue omeprazole 20 mg daily         Relevant Medications    omeprazole (PRILOSEC) 20 MG delayed release capsule    Cervical radiculopathy     Following with PT, pain management  Planning FATOU         B12 deficiency     Continue supplement  Recheck         Relevant Orders    Vitamin B12 & Folate     Other Visit Diagnoses       Ingrown nail of great toe of right foot        Scheduled with podiatry to discuss removal    Paronychia of great toe of right foot        Treat with Keflex given planning possible ingrown toenail procedure    Relevant Medications    cephALEXin (KEFLEX) 500 MG capsule    Skin tag        Reports intermittent itching/irrtation of skin tag  Removed today, see procedure report below    Relevant Medications    lidocaine 1 % injection 2 mL (Start on 2024  1:15 PM)          Skin Tag Removal Procedure Note    Pre-operative Diagnosis: classic skin tag (acrochordon)    Post-operative Diagnosis: classic skin tag (acrochordon)    Locations: left medial mid-lower back    Indications: irritation, itching    Anesthesia: lidocaine 1% without

## 2024-10-07 SDOH — ECONOMIC STABILITY: FOOD INSECURITY: WITHIN THE PAST 12 MONTHS, YOU WORRIED THAT YOUR FOOD WOULD RUN OUT BEFORE YOU GOT MONEY TO BUY MORE.: NEVER TRUE

## 2024-10-07 SDOH — ECONOMIC STABILITY: INCOME INSECURITY: HOW HARD IS IT FOR YOU TO PAY FOR THE VERY BASICS LIKE FOOD, HOUSING, MEDICAL CARE, AND HEATING?: NOT HARD AT ALL

## 2024-10-07 SDOH — ECONOMIC STABILITY: FOOD INSECURITY: WITHIN THE PAST 12 MONTHS, THE FOOD YOU BOUGHT JUST DIDN'T LAST AND YOU DIDN'T HAVE MONEY TO GET MORE.: NEVER TRUE

## 2024-10-07 SDOH — ECONOMIC STABILITY: TRANSPORTATION INSECURITY
IN THE PAST 12 MONTHS, HAS LACK OF TRANSPORTATION KEPT YOU FROM MEETINGS, WORK, OR FROM GETTING THINGS NEEDED FOR DAILY LIVING?: NO

## 2024-10-10 ENCOUNTER — OFFICE VISIT (OUTPATIENT)
Dept: PRIMARY CARE CLINIC | Age: 54
End: 2024-10-10

## 2024-10-10 VITALS
OXYGEN SATURATION: 96 % | TEMPERATURE: 97.6 F | BODY MASS INDEX: 45.1 KG/M2 | HEART RATE: 89 BPM | SYSTOLIC BLOOD PRESSURE: 130 MMHG | DIASTOLIC BLOOD PRESSURE: 80 MMHG | HEIGHT: 70 IN | WEIGHT: 315 LBS

## 2024-10-10 DIAGNOSIS — K21.9 GASTROESOPHAGEAL REFLUX DISEASE WITHOUT ESOPHAGITIS: ICD-10-CM

## 2024-10-10 DIAGNOSIS — E53.8 B12 DEFICIENCY: ICD-10-CM

## 2024-10-10 DIAGNOSIS — E11.9 TYPE 2 DIABETES MELLITUS WITHOUT COMPLICATION, WITHOUT LONG-TERM CURRENT USE OF INSULIN (HCC): Primary | ICD-10-CM

## 2024-10-10 DIAGNOSIS — M54.12 CERVICAL RADICULOPATHY: ICD-10-CM

## 2024-10-10 LAB — HBA1C MFR BLD: 6.9 %

## 2024-10-10 RX ORDER — SEMAGLUTIDE 1.34 MG/ML
1 INJECTION, SOLUTION SUBCUTANEOUS
Qty: 9 ML | Refills: 1 | Status: SHIPPED | OUTPATIENT
Start: 2024-10-10

## 2024-10-10 RX ORDER — REPAGLINIDE 2 MG/1
2 TABLET ORAL
Qty: 270 TABLET | Refills: 1 | Status: SHIPPED | OUTPATIENT
Start: 2024-10-10

## 2024-10-10 RX ORDER — UREA 10 %
500 LOTION (ML) TOPICAL DAILY
COMMUNITY

## 2024-10-10 NOTE — PROGRESS NOTES
ESTABLISHED PRIMARY CARE VISIT    10/10/24  Name: Luciano Stone   : 1970   Age: 54 y.o.  Sex: male        Assessment & Plan:     Problem List Items Addressed This Visit       Type 2 diabetes mellitus without complication, without long-term current use of insulin (HCC) - Primary     Chronic, controlled with A1c 6.9  Continue Jardiance 25 mg daily, metformin 1000 mg twice daily, Ozempic 1 mg weekly, repaglinide 2 mg 3 times daily  Continue healthy lifestyle changes  Following with optometry for eye exams, podiatry for foot exams         Relevant Medications    metFORMIN (GLUCOPHAGE) 1000 MG tablet    repaglinide (PRANDIN) 2 MG tablet    empagliflozin (JARDIANCE) 25 MG tablet    Semaglutide, 1 MG/DOSE, (OZEMPIC, 1 MG/DOSE,) 4 MG/3ML SOPN sc injection    Other Relevant Orders    POCT glycosylated hemoglobin (Hb A1C) (Completed)    Gastroesophageal reflux disease     Continue omeprazole 20 mg daily         Relevant Medications    omeprazole (PRILOSEC) 20 MG delayed release capsule    Cervical radiculopathy     Chronic, improved  Following with PT  Consider surgery referral if worsens again         B12 deficiency    Relevant Medications    vitamin B-12 (CYANOCOBALAMIN) 500 MCG tablet     Counseled patient regarding above diagnosis, including possible risks and complications, especially if left uncontrolled.  Counseled patient as appropriate and relevant regarding any possible side effects, risks, and alternatives to treatment; the patient verbalizes understanding, and is in agreement with the plan as detailed above.   All educational materials and instructions were discussed and included on the After Visit Summary.  All questions answered to the patient's satisfaction.  The patient was advised to call or send [employer] message for any concerns prior to next appointment.    Return in about 6 months (around 4/10/2025).    This visit is inherently complex due to evaluation and management associated with

## 2024-11-09 NOTE — ASSESSMENT & PLAN NOTE
Chronic, controlled with A1c 6.9  Continue Jardiance 25 mg daily, metformin 1000 mg twice daily, Ozempic 1 mg weekly, repaglinide 2 mg 3 times daily  Continue healthy lifestyle changes  Following with optometry for eye exams, podiatry for foot exams

## 2024-12-30 ENCOUNTER — PATIENT MESSAGE (OUTPATIENT)
Dept: PRIMARY CARE CLINIC | Age: 54
End: 2024-12-30

## 2025-03-03 DIAGNOSIS — E11.9 TYPE 2 DIABETES MELLITUS WITHOUT COMPLICATION, WITHOUT LONG-TERM CURRENT USE OF INSULIN (HCC): ICD-10-CM

## 2025-03-05 RX ORDER — REPAGLINIDE 2 MG/1
3 TABLET ORAL
Qty: 405 TABLET | Refills: 0 | Status: SHIPPED | OUTPATIENT
Start: 2025-03-05

## 2025-04-05 DIAGNOSIS — E11.9 TYPE 2 DIABETES MELLITUS WITHOUT COMPLICATION, WITHOUT LONG-TERM CURRENT USE OF INSULIN: ICD-10-CM

## 2025-04-07 RX ORDER — BLOOD SUGAR DIAGNOSTIC
STRIP MISCELLANEOUS
Qty: 300 STRIP | Refills: 3 | Status: SHIPPED | OUTPATIENT
Start: 2025-04-07

## 2025-04-07 ASSESSMENT — PATIENT HEALTH QUESTIONNAIRE - PHQ9
SUM OF ALL RESPONSES TO PHQ QUESTIONS 1-9: 0
SUM OF ALL RESPONSES TO PHQ QUESTIONS 1-9: 0
SUM OF ALL RESPONSES TO PHQ9 QUESTIONS 1 & 2: 0
1. LITTLE INTEREST OR PLEASURE IN DOING THINGS: NOT AT ALL
SUM OF ALL RESPONSES TO PHQ QUESTIONS 1-9: 0
1. LITTLE INTEREST OR PLEASURE IN DOING THINGS: NOT AT ALL
2. FEELING DOWN, DEPRESSED OR HOPELESS: NOT AT ALL
2. FEELING DOWN, DEPRESSED OR HOPELESS: NOT AT ALL
SUM OF ALL RESPONSES TO PHQ QUESTIONS 1-9: 0

## 2025-04-07 NOTE — TELEPHONE ENCOUNTER
Name of Medication(s) Requested:  Requested Prescriptions     Pending Prescriptions Disp Refills    blood glucose test strips (ONETOUCH VERIO) strip [Pharmacy Med Name: ONE TOUCH VERIO TEST STRIP] 300 strip 1     Sig: TEST 3 TIMES A DAY       Medication is on current medication list Yes    Dosage and directions were verified? Yes    Quantity verified: 90 day supply     Pharmacy Verified?  Yes    Last Appointment:  10/10/2024    Future appts:  Future Appointments   Date Time Provider Department Center   4/10/2025 12:00 PM Shayne Chavez MD [facility] DEP        (If no appt send self scheduling link. .REFILLAPPT)  Scheduling request sent?     [] Yes  [x] No    Does patient need updated?  [] Yes  [x] No

## 2025-04-08 DIAGNOSIS — E11.9 TYPE 2 DIABETES MELLITUS WITHOUT COMPLICATION, WITHOUT LONG-TERM CURRENT USE OF INSULIN: ICD-10-CM

## 2025-04-08 RX ORDER — SEMAGLUTIDE 1.34 MG/ML
1 INJECTION, SOLUTION SUBCUTANEOUS
Qty: 9 ML | Refills: 0 | Status: SHIPPED
Start: 2025-04-08 | End: 2025-04-10 | Stop reason: SDUPTHER

## 2025-04-08 NOTE — TELEPHONE ENCOUNTER
Name of Medication(s) Requested:  Requested Prescriptions     Pending Prescriptions Disp Refills    OZEMPIC, 1 MG/DOSE, 4 MG/3ML SOPN sc injection [Pharmacy Med Name: OZEMPIC 4 MG/3 ML (1 MG/DOSE)]  1     Sig: INJECT 1 MG INTO THE SKIN EVERY 7 DAYS       Medication is on current medication list Yes    Dosage and directions were verified? Yes    Quantity verified: 90 day supply     Pharmacy Verified?  Yes    Last Appointment:  10/10/2024    Future appts:  Future Appointments   Date Time Provider Department Center   4/10/2025 12:00 PM Shayne Chavez MD [facility] DEP        (If no appt send self scheduling link. .REFILLAPPT)  Scheduling request sent?     [] Yes  [x] No    Does patient need updated?  [] Yes  [x] No

## 2025-04-10 ENCOUNTER — OFFICE VISIT (OUTPATIENT)
Dept: PRIMARY CARE CLINIC | Age: 55
End: 2025-04-10
Payer: COMMERCIAL

## 2025-04-10 VITALS
RESPIRATION RATE: 18 BRPM | DIASTOLIC BLOOD PRESSURE: 82 MMHG | BODY MASS INDEX: 45.1 KG/M2 | TEMPERATURE: 97 F | SYSTOLIC BLOOD PRESSURE: 138 MMHG | HEIGHT: 70 IN | HEART RATE: 84 BPM | WEIGHT: 315 LBS | OXYGEN SATURATION: 97 %

## 2025-04-10 DIAGNOSIS — I48.0 PAROXYSMAL ATRIAL FIBRILLATION (HCC): ICD-10-CM

## 2025-04-10 DIAGNOSIS — Z12.5 SCREENING FOR PROSTATE CANCER: ICD-10-CM

## 2025-04-10 DIAGNOSIS — I10 ESSENTIAL HYPERTENSION: ICD-10-CM

## 2025-04-10 DIAGNOSIS — E11.9 TYPE 2 DIABETES MELLITUS WITHOUT COMPLICATION, WITHOUT LONG-TERM CURRENT USE OF INSULIN (HCC): Primary | ICD-10-CM

## 2025-04-10 DIAGNOSIS — Z79.899 LONG-TERM CURRENT USE OF PROTON PUMP INHIBITOR THERAPY: ICD-10-CM

## 2025-04-10 DIAGNOSIS — K21.9 GASTROESOPHAGEAL REFLUX DISEASE WITHOUT ESOPHAGITIS: ICD-10-CM

## 2025-04-10 LAB — HBA1C MFR BLD: 7.4 %

## 2025-04-10 PROCEDURE — 2022F DILAT RTA XM EVC RTNOPTHY: CPT | Performed by: STUDENT IN AN ORGANIZED HEALTH CARE EDUCATION/TRAINING PROGRAM

## 2025-04-10 PROCEDURE — 3017F COLORECTAL CA SCREEN DOC REV: CPT | Performed by: STUDENT IN AN ORGANIZED HEALTH CARE EDUCATION/TRAINING PROGRAM

## 2025-04-10 PROCEDURE — 1036F TOBACCO NON-USER: CPT | Performed by: STUDENT IN AN ORGANIZED HEALTH CARE EDUCATION/TRAINING PROGRAM

## 2025-04-10 PROCEDURE — G8427 DOCREV CUR MEDS BY ELIG CLIN: HCPCS | Performed by: STUDENT IN AN ORGANIZED HEALTH CARE EDUCATION/TRAINING PROGRAM

## 2025-04-10 PROCEDURE — 3079F DIAST BP 80-89 MM HG: CPT | Performed by: STUDENT IN AN ORGANIZED HEALTH CARE EDUCATION/TRAINING PROGRAM

## 2025-04-10 PROCEDURE — 99214 OFFICE O/P EST MOD 30 MIN: CPT | Performed by: STUDENT IN AN ORGANIZED HEALTH CARE EDUCATION/TRAINING PROGRAM

## 2025-04-10 PROCEDURE — 3075F SYST BP GE 130 - 139MM HG: CPT | Performed by: STUDENT IN AN ORGANIZED HEALTH CARE EDUCATION/TRAINING PROGRAM

## 2025-04-10 PROCEDURE — G2211 COMPLEX E/M VISIT ADD ON: HCPCS | Performed by: STUDENT IN AN ORGANIZED HEALTH CARE EDUCATION/TRAINING PROGRAM

## 2025-04-10 PROCEDURE — 83036 HEMOGLOBIN GLYCOSYLATED A1C: CPT | Performed by: STUDENT IN AN ORGANIZED HEALTH CARE EDUCATION/TRAINING PROGRAM

## 2025-04-10 PROCEDURE — 3051F HG A1C>EQUAL 7.0%<8.0%: CPT | Performed by: STUDENT IN AN ORGANIZED HEALTH CARE EDUCATION/TRAINING PROGRAM

## 2025-04-10 PROCEDURE — G8417 CALC BMI ABV UP PARAM F/U: HCPCS | Performed by: STUDENT IN AN ORGANIZED HEALTH CARE EDUCATION/TRAINING PROGRAM

## 2025-04-10 RX ORDER — REPAGLINIDE 2 MG/1
3 TABLET ORAL
Qty: 405 TABLET | Refills: 1 | Status: SHIPPED | OUTPATIENT
Start: 2025-04-10

## 2025-04-10 RX ORDER — DILTIAZEM HYDROCHLORIDE 240 MG/1
CAPSULE, EXTENDED RELEASE ORAL DAILY
COMMUNITY
Start: 2025-03-14

## 2025-04-10 RX ORDER — SEMAGLUTIDE 1.34 MG/ML
1 INJECTION, SOLUTION SUBCUTANEOUS
Qty: 9 ML | Refills: 1 | Status: SHIPPED | OUTPATIENT
Start: 2025-04-10

## 2025-04-10 RX ORDER — OMEPRAZOLE 20 MG/1
20 CAPSULE, DELAYED RELEASE ORAL DAILY
Qty: 90 CAPSULE | Refills: 1 | Status: SHIPPED | OUTPATIENT
Start: 2025-04-10 | End: 2025-10-07

## 2025-04-10 SDOH — ECONOMIC STABILITY: FOOD INSECURITY: WITHIN THE PAST 12 MONTHS, YOU WORRIED THAT YOUR FOOD WOULD RUN OUT BEFORE YOU GOT MONEY TO BUY MORE.: NEVER TRUE

## 2025-04-10 SDOH — ECONOMIC STABILITY: FOOD INSECURITY: WITHIN THE PAST 12 MONTHS, THE FOOD YOU BOUGHT JUST DIDN'T LAST AND YOU DIDN'T HAVE MONEY TO GET MORE.: NEVER TRUE

## 2025-04-10 NOTE — ASSESSMENT & PLAN NOTE
Chronic, controlled, slightly elevated today given concurrent diabetes  Follows with cardiology  Continue diltiazem  mg daily, losartan 100 mg daily, metoprolol XL 75 mg twice daily, sotalol 160 mg twice daily

## 2025-04-10 NOTE — ASSESSMENT & PLAN NOTE
Chronic, rate and rhythm controlled today  Intermittent episodes with VR  Follows with cardiology, referred to EP, considering ablation  Continue diltiazem  mg daily, metoprolol XL 75 mg twice daily, sotalol 160 mg twice daily

## 2025-04-10 NOTE — ASSESSMENT & PLAN NOTE
Chronic, controlled with A1c 7.4  Continue Jardiance 25 mg daily, metformin 1000 mg twice daily, Ozempic 1 mg weekly, repaglinide 2 mg 3 times daily  Consider increasing Ozempic 2 mg weekly if A1c trending up  Continue working on healthy lifestyle changes  Following with optometry for eye exams, podiatry for foot exams

## 2025-04-10 NOTE — PROGRESS NOTES
ESTABLISHED PRIMARY CARE VISIT    4/10/25  Name: Luciano Stone   : 1970   Age: 55 y.o.  Sex: male        Assessment & Plan:     Problem List Items Addressed This Visit       Atrial fibrillation (HCC)    Chronic, rate and rhythm controlled today  Intermittent episodes with RVR  Follows with cardiology, referred to EP, considering ablation  Continue diltiazem  mg daily, metoprolol XL 75 mg twice daily, sotalol 160 mg twice daily         Relevant Medications    dilTIAZem (DILACOR XR) 240 MG extended release capsule    Other Relevant Orders    Comprehensive Metabolic Panel, Fasting    CBC    TSH    Essential hypertension    Chronic, controlled, slightly elevated today given concurrent diabetes  Follows with cardiology  Continue diltiazem  mg daily, losartan 100 mg daily, metoprolol XL 75 mg twice daily, sotalol 160 mg twice daily         Relevant Medications    dilTIAZem (DILACOR XR) 240 MG extended release capsule    Other Relevant Orders    Comprehensive Metabolic Panel, Fasting    Albumin/Creatinine Ratio, Urine    Gastroesophageal reflux disease    Continue omeprazole 20 mg daily         Relevant Medications    omeprazole (PRILOSEC) 20 MG delayed release capsule    Type 2 diabetes mellitus without complication, without long-term current use of insulin - Primary    Chronic, controlled with A1c 7.4  Continue Jardiance 25 mg daily, metformin 1000 mg twice daily, Ozempic 1 mg weekly, repaglinide 2 mg 3 times daily  Consider increasing Ozempic 2 mg weekly if A1c trending up  Continue working on healthy lifestyle changes  Following with optometry for eye exams, podiatry for foot exams         Relevant Medications    empagliflozin (JARDIANCE) 25 MG tablet    metFORMIN (GLUCOPHAGE) 1000 MG tablet    repaglinide (PRANDIN) 2 MG tablet    Semaglutide, 1 MG/DOSE, (OZEMPIC, 1 MG/DOSE,) 4 MG/3ML SOPN sc injection    Other Relevant Orders    POCT glycosylated hemoglobin (Hb A1C) (Completed)

## 2025-07-17 ENCOUNTER — LAB (OUTPATIENT)
Dept: PRIMARY CARE CLINIC | Age: 55
End: 2025-07-17
Payer: COMMERCIAL

## 2025-07-17 ENCOUNTER — OFFICE VISIT (OUTPATIENT)
Dept: PRIMARY CARE CLINIC | Age: 55
End: 2025-07-17
Payer: COMMERCIAL

## 2025-07-17 VITALS
BODY MASS INDEX: 45.1 KG/M2 | DIASTOLIC BLOOD PRESSURE: 70 MMHG | HEART RATE: 90 BPM | WEIGHT: 315 LBS | TEMPERATURE: 98.4 F | SYSTOLIC BLOOD PRESSURE: 126 MMHG | HEIGHT: 70 IN | OXYGEN SATURATION: 96 %

## 2025-07-17 DIAGNOSIS — I51.7 LEFT ATRIAL ENLARGEMENT: ICD-10-CM

## 2025-07-17 DIAGNOSIS — Z12.5 SCREENING FOR PROSTATE CANCER: ICD-10-CM

## 2025-07-17 DIAGNOSIS — K21.9 GASTROESOPHAGEAL REFLUX DISEASE WITHOUT ESOPHAGITIS: ICD-10-CM

## 2025-07-17 DIAGNOSIS — E53.8 B12 DEFICIENCY: Primary | ICD-10-CM

## 2025-07-17 DIAGNOSIS — M54.12 CERVICAL RADICULOPATHY: ICD-10-CM

## 2025-07-17 DIAGNOSIS — E11.9 TYPE 2 DIABETES MELLITUS WITHOUT COMPLICATION, WITHOUT LONG-TERM CURRENT USE OF INSULIN (HCC): ICD-10-CM

## 2025-07-17 DIAGNOSIS — Z79.899 LONG-TERM CURRENT USE OF PROTON PUMP INHIBITOR THERAPY: ICD-10-CM

## 2025-07-17 DIAGNOSIS — I10 ESSENTIAL HYPERTENSION: ICD-10-CM

## 2025-07-17 DIAGNOSIS — I48.0 PAROXYSMAL ATRIAL FIBRILLATION (HCC): ICD-10-CM

## 2025-07-17 DIAGNOSIS — E11.9 TYPE 2 DIABETES MELLITUS WITHOUT COMPLICATION, WITHOUT LONG-TERM CURRENT USE OF INSULIN (HCC): Primary | ICD-10-CM

## 2025-07-17 LAB
ALBUMIN: 4.3 G/DL (ref 3.5–5.2)
ALP BLD-CCNC: 51 U/L (ref 40–129)
ALT SERPL-CCNC: 23 U/L (ref 0–50)
ANION GAP SERPL CALCULATED.3IONS-SCNC: 15 MMOL/L (ref 7–16)
AST SERPL-CCNC: 20 U/L (ref 0–50)
BILIRUB SERPL-MCNC: 0.8 MG/DL (ref 0–1.2)
BUN BLDV-MCNC: 21 MG/DL (ref 6–20)
CALCIUM SERPL-MCNC: 9.6 MG/DL (ref 8.6–10)
CHLORIDE BLD-SCNC: 101 MMOL/L (ref 98–107)
CHOLESTEROL, FASTING: 121 MG/DL
CO2: 24 MMOL/L (ref 22–29)
CREAT SERPL-MCNC: 0.8 MG/DL (ref 0.7–1.2)
CREATININE URINE: 66.9 MG/DL (ref 40–278)
FOLATE: 16.7 NG/ML (ref 4.6–34.8)
GFR, ESTIMATED: >90 ML/MIN/1.73M2
GLUCOSE FASTING: 169 MG/DL (ref 74–99)
HBA1C MFR BLD: 7.6 %
HCT VFR BLD CALC: 45.3 % (ref 37–54)
HDLC SERPL-MCNC: 44 MG/DL
HEMOGLOBIN: 14.8 G/DL (ref 12.5–16.5)
LDL CHOLESTEROL: 61 MG/DL
MAGNESIUM: 1.9 MG/DL (ref 1.6–2.6)
MCH RBC QN AUTO: 31.1 PG (ref 26–35)
MCHC RBC AUTO-ENTMCNC: 32.7 G/DL (ref 32–34.5)
MCV RBC AUTO: 95.2 FL (ref 80–99.9)
MICROALBUMIN/CREAT 24H UR: <12 MG/L (ref 0–20)
MICROALBUMIN/CREAT UR-RTO: <18 MCG/MG CREAT (ref 0–30)
PDW BLD-RTO: 15.1 % (ref 11.5–15)
PLATELET # BLD: 196 K/UL (ref 130–450)
PMV BLD AUTO: 9.9 FL (ref 7–12)
POTASSIUM SERPL-SCNC: 4.8 MMOL/L (ref 3.5–5.1)
PROSTATE SPECIFIC ANTIGEN: 0.4 NG/ML (ref 0–4)
RBC # BLD: 4.76 M/UL (ref 3.8–5.8)
SODIUM BLD-SCNC: 140 MMOL/L (ref 136–145)
TOTAL PROTEIN: 7.1 G/DL (ref 6.4–8.3)
TRIGLYCERIDE, FASTING: 79 MG/DL
TSH SERPL DL<=0.05 MIU/L-ACNC: 1.94 UIU/ML (ref 0.27–4.2)
VITAMIN B-12: 484 PG/ML (ref 232–1245)
VLDLC SERPL CALC-MCNC: 16 MG/DL
WBC # BLD: 7.8 K/UL (ref 4.5–11.5)

## 2025-07-17 PROCEDURE — 3078F DIAST BP <80 MM HG: CPT | Performed by: STUDENT IN AN ORGANIZED HEALTH CARE EDUCATION/TRAINING PROGRAM

## 2025-07-17 PROCEDURE — G8417 CALC BMI ABV UP PARAM F/U: HCPCS | Performed by: STUDENT IN AN ORGANIZED HEALTH CARE EDUCATION/TRAINING PROGRAM

## 2025-07-17 PROCEDURE — 99214 OFFICE O/P EST MOD 30 MIN: CPT | Performed by: STUDENT IN AN ORGANIZED HEALTH CARE EDUCATION/TRAINING PROGRAM

## 2025-07-17 PROCEDURE — 1036F TOBACCO NON-USER: CPT | Performed by: STUDENT IN AN ORGANIZED HEALTH CARE EDUCATION/TRAINING PROGRAM

## 2025-07-17 PROCEDURE — G8427 DOCREV CUR MEDS BY ELIG CLIN: HCPCS | Performed by: STUDENT IN AN ORGANIZED HEALTH CARE EDUCATION/TRAINING PROGRAM

## 2025-07-17 PROCEDURE — 3074F SYST BP LT 130 MM HG: CPT | Performed by: STUDENT IN AN ORGANIZED HEALTH CARE EDUCATION/TRAINING PROGRAM

## 2025-07-17 PROCEDURE — 2022F DILAT RTA XM EVC RTNOPTHY: CPT | Performed by: STUDENT IN AN ORGANIZED HEALTH CARE EDUCATION/TRAINING PROGRAM

## 2025-07-17 PROCEDURE — 3017F COLORECTAL CA SCREEN DOC REV: CPT | Performed by: STUDENT IN AN ORGANIZED HEALTH CARE EDUCATION/TRAINING PROGRAM

## 2025-07-17 PROCEDURE — 83036 HEMOGLOBIN GLYCOSYLATED A1C: CPT | Performed by: STUDENT IN AN ORGANIZED HEALTH CARE EDUCATION/TRAINING PROGRAM

## 2025-07-17 PROCEDURE — 3051F HG A1C>EQUAL 7.0%<8.0%: CPT | Performed by: STUDENT IN AN ORGANIZED HEALTH CARE EDUCATION/TRAINING PROGRAM

## 2025-07-17 PROCEDURE — 36415 COLL VENOUS BLD VENIPUNCTURE: CPT | Performed by: STUDENT IN AN ORGANIZED HEALTH CARE EDUCATION/TRAINING PROGRAM

## 2025-07-17 RX ORDER — SEMAGLUTIDE 1.34 MG/ML
1 INJECTION, SOLUTION SUBCUTANEOUS
Qty: 9 ML | Refills: 0 | Status: SHIPPED | OUTPATIENT
Start: 2025-07-17

## 2025-07-17 RX ORDER — REPAGLINIDE 2 MG/1
3 TABLET ORAL
Qty: 405 TABLET | Refills: 0 | Status: SHIPPED | OUTPATIENT
Start: 2025-07-17

## 2025-07-17 RX ORDER — OMEPRAZOLE 20 MG/1
20 CAPSULE, DELAYED RELEASE ORAL DAILY
Qty: 90 CAPSULE | Refills: 0 | Status: SHIPPED | OUTPATIENT
Start: 2025-07-17

## 2025-07-17 NOTE — PROGRESS NOTES
ESTABLISHED PRIMARY CARE VISIT    25  Name: Luciano Stone   : 1970   Age: 55 y.o.  Sex: male        Assessment & Plan:     Problem List Items Addressed This Visit       Atrial fibrillation (HCC)    Chronic, rate and rhythm controlled today  S/p ablation for intermittent episodes with RVR  Follows with cardiology, referred to EP  Continue diltiazem  mg daily, metoprolol XL 75 mg twice daily, sotalol 160 mg twice daily, Eliquis 5 mg twice daily         Essential hypertension    Chronic, controlled  Continue diltiazem  mg daily, losartan 100 mg daily, metoprolol XL 75 mg twice daily, sotalol 160 mg twice daily         Gastroesophageal reflux disease    Continue omeprazole 20 mg daily         Relevant Medications    omeprazole (PRILOSEC) 20 MG delayed release capsule    Type 2 diabetes mellitus without complication, without long-term current use of insulin (HCC) - Primary    Chronic, mildly uncontrolled with A1c 7.6  Continue Jardiance 25 mg daily, metformin 1000 mg twice daily, Ozempic 1 mg weekly, repaglinide 2 mg 3 times daily  Consider increasing Ozempic 2 mg weekly if A1c trending up  Continue working on healthy lifestyle changes  Following with optometry for eye exams, podiatry for foot exams         Relevant Medications    repaglinide (PRANDIN) 2 MG tablet    Semaglutide, 1 MG/DOSE, (OZEMPIC, 1 MG/DOSE,) 4 MG/3ML SOPN sc injection    metFORMIN (GLUCOPHAGE) 1000 MG tablet    empagliflozin (JARDIANCE) 25 MG tablet    Other Relevant Orders    POCT glycosylated hemoglobin (Hb A1C) (Completed)     Counseled patient regarding above diagnosis, including possible risks and complications, especially if left uncontrolled.  Counseled patient as appropriate and relevant regarding any possible side effects, risks, and alternatives to treatment; the patient verbalizes understanding, and is in agreement with the plan as detailed above.   All educational materials and instructions were

## 2025-07-29 NOTE — ASSESSMENT & PLAN NOTE
Chronic, controlled  Continue diltiazem  mg daily, losartan 100 mg daily, metoprolol XL 75 mg twice daily, sotalol 160 mg twice daily

## 2025-07-29 NOTE — ASSESSMENT & PLAN NOTE
Chronic, mildly uncontrolled with A1c 7.6  Continue Jardiance 25 mg daily, metformin 1000 mg twice daily, Ozempic 1 mg weekly, repaglinide 2 mg 3 times daily  Consider increasing Ozempic 2 mg weekly if A1c trending up  Continue working on healthy lifestyle changes  Following with optometry for eye exams, podiatry for foot exams

## 2025-08-21 DIAGNOSIS — E11.9 TYPE 2 DIABETES MELLITUS WITHOUT COMPLICATION, WITHOUT LONG-TERM CURRENT USE OF INSULIN (HCC): ICD-10-CM

## 2025-08-21 RX ORDER — AVOBENZONE, HOMOSALATE, OCTISALATE, OCTOCRYLENE 30; 40; 45; 26 MG/ML; MG/ML; MG/ML; MG/ML
CREAM TOPICAL
Qty: 300 EACH | Refills: 3 | Status: SHIPPED | OUTPATIENT
Start: 2025-08-21

## 2025-08-21 RX ORDER — LANCETS 30 GAUGE
EACH MISCELLANEOUS
Qty: 300 EACH | Refills: 3 | Status: SHIPPED | OUTPATIENT
Start: 2025-08-21